# Patient Record
Sex: MALE | Race: WHITE | NOT HISPANIC OR LATINO | Employment: UNEMPLOYED | URBAN - NONMETROPOLITAN AREA
[De-identification: names, ages, dates, MRNs, and addresses within clinical notes are randomized per-mention and may not be internally consistent; named-entity substitution may affect disease eponyms.]

---

## 2018-06-24 ENCOUNTER — HOSPITAL ENCOUNTER (EMERGENCY)
Facility: HOSPITAL | Age: 43
Discharge: ANOTHER HOSPITAL | End: 2018-06-24
Attending: INTERNAL MEDICINE | Admitting: INTERNAL MEDICINE

## 2018-06-24 ENCOUNTER — APPOINTMENT (OUTPATIENT)
Dept: GENERAL RADIOLOGY | Facility: HOSPITAL | Age: 43
End: 2018-06-24

## 2018-06-24 VITALS
WEIGHT: 240 LBS | DIASTOLIC BLOOD PRESSURE: 72 MMHG | BODY MASS INDEX: 32.51 KG/M2 | HEART RATE: 94 BPM | SYSTOLIC BLOOD PRESSURE: 127 MMHG | TEMPERATURE: 98.2 F | RESPIRATION RATE: 20 BRPM | HEIGHT: 72 IN | OXYGEN SATURATION: 99 %

## 2018-06-24 DIAGNOSIS — B19.9 VIRAL HEPATITIS WITHOUT HEPATIC COMA, UNSPECIFIED CHRONICITY, UNSPECIFIED VIRAL HEPATITIS TYPE: Primary | ICD-10-CM

## 2018-06-24 LAB
ALBUMIN SERPL-MCNC: 3.1 G/DL (ref 3.5–5)
ALBUMIN/GLOB SERPL: 0.9 G/DL (ref 1.5–2.5)
ALP SERPL-CCNC: 180 U/L (ref 40–129)
ALT SERPL W P-5'-P-CCNC: 3320 U/L (ref 10–44)
AMYLASE SERPL-CCNC: 97 U/L (ref 28–100)
ANION GAP SERPL CALCULATED.3IONS-SCNC: 6.4 MMOL/L (ref 3.6–11.2)
AST SERPL-CCNC: 1481 U/L (ref 10–34)
BASOPHILS # BLD AUTO: 0.04 10*3/MM3 (ref 0–0.3)
BASOPHILS NFR BLD AUTO: 0.9 % (ref 0–2)
BILIRUB SERPL-MCNC: 11.3 MG/DL (ref 0.2–1.8)
BNP SERPL-MCNC: 5 PG/ML (ref 0–100)
BUN BLD-MCNC: 14 MG/DL (ref 7–21)
BUN/CREAT SERPL: 14.4 (ref 7–25)
CALCIUM SPEC-SCNC: 8.5 MG/DL (ref 7.7–10)
CHLORIDE SERPL-SCNC: 101 MMOL/L (ref 99–112)
CO2 SERPL-SCNC: 25.6 MMOL/L (ref 24.3–31.9)
CREAT BLD-MCNC: 0.97 MG/DL (ref 0.43–1.29)
DEPRECATED RDW RBC AUTO: 47.6 FL (ref 37–54)
EOSINOPHIL # BLD AUTO: 0.1 10*3/MM3 (ref 0–0.7)
EOSINOPHIL NFR BLD AUTO: 2.2 % (ref 0–5)
ERYTHROCYTE [DISTWIDTH] IN BLOOD BY AUTOMATED COUNT: 15.1 % (ref 11.5–14.5)
GFR SERPL CREATININE-BSD FRML MDRD: 85 ML/MIN/1.73
GLOBULIN UR ELPH-MCNC: 3.5 GM/DL
GLUCOSE BLD-MCNC: 97 MG/DL (ref 70–110)
HAV IGM SERPL QL IA: REACTIVE
HBV CORE IGM SERPL QL IA: ABNORMAL
HBV SURFACE AG SERPL QL CFM: NORMAL
HBV SURFACE AG SERPL QL IA: REACTIVE
HCT VFR BLD AUTO: 43.9 % (ref 42–52)
HCV AB SER DONR QL: REACTIVE
HGB BLD-MCNC: 15.6 G/DL (ref 14–18)
IMM GRANULOCYTES # BLD: 0 10*3/MM3 (ref 0–0.03)
IMM GRANULOCYTES NFR BLD: 0 % (ref 0–0.5)
INR PPP: 1.28 (ref 0.9–1.1)
LIPASE SERPL-CCNC: 67 U/L (ref 13–60)
LYMPHOCYTES # BLD AUTO: 1.35 10*3/MM3 (ref 1–3)
LYMPHOCYTES NFR BLD AUTO: 29.3 % (ref 21–51)
MCH RBC QN AUTO: 30.4 PG (ref 27–33)
MCHC RBC AUTO-ENTMCNC: 35.5 G/DL (ref 33–37)
MCV RBC AUTO: 85.6 FL (ref 80–94)
MONOCYTES # BLD AUTO: 0.85 10*3/MM3 (ref 0.1–0.9)
MONOCYTES NFR BLD AUTO: 18.4 % (ref 0–10)
NEUTROPHILS # BLD AUTO: 2.27 10*3/MM3 (ref 1.4–6.5)
NEUTROPHILS NFR BLD AUTO: 49.2 % (ref 30–70)
OSMOLALITY SERPL CALC.SUM OF ELEC: 266.8 MOSM/KG (ref 273–305)
PLATELET # BLD AUTO: 129 10*3/MM3 (ref 130–400)
PMV BLD AUTO: 12.2 FL (ref 6–10)
POTASSIUM BLD-SCNC: 3.7 MMOL/L (ref 3.5–5.3)
PROT SERPL-MCNC: 6.6 G/DL (ref 6–8)
PROTHROMBIN TIME: 16.2 SECONDS (ref 11–15.4)
RBC # BLD AUTO: 5.13 10*6/MM3 (ref 4.7–6.1)
SODIUM BLD-SCNC: 133 MMOL/L (ref 135–153)
TROPONIN I SERPL-MCNC: 0.01 NG/ML
TROPONIN I SERPL-MCNC: <0.006 NG/ML
WBC NRBC COR # BLD: 4.61 10*3/MM3 (ref 4.5–12.5)

## 2018-06-24 PROCEDURE — 85025 COMPLETE CBC W/AUTO DIFF WBC: CPT | Performed by: INTERNAL MEDICINE

## 2018-06-24 PROCEDURE — 71045 X-RAY EXAM CHEST 1 VIEW: CPT

## 2018-06-24 PROCEDURE — 71045 X-RAY EXAM CHEST 1 VIEW: CPT | Performed by: RADIOLOGY

## 2018-06-24 PROCEDURE — 93010 ELECTROCARDIOGRAM REPORT: CPT | Performed by: INTERNAL MEDICINE

## 2018-06-24 PROCEDURE — 83690 ASSAY OF LIPASE: CPT | Performed by: INTERNAL MEDICINE

## 2018-06-24 PROCEDURE — 82150 ASSAY OF AMYLASE: CPT | Performed by: INTERNAL MEDICINE

## 2018-06-24 PROCEDURE — 87341 HEP B SURFACE AG NEUTRLZJ IA: CPT | Performed by: INTERNAL MEDICINE

## 2018-06-24 PROCEDURE — 99284 EMERGENCY DEPT VISIT MOD MDM: CPT

## 2018-06-24 PROCEDURE — 87040 BLOOD CULTURE FOR BACTERIA: CPT | Performed by: INTERNAL MEDICINE

## 2018-06-24 PROCEDURE — 80053 COMPREHEN METABOLIC PANEL: CPT | Performed by: INTERNAL MEDICINE

## 2018-06-24 PROCEDURE — 84484 ASSAY OF TROPONIN QUANT: CPT | Performed by: INTERNAL MEDICINE

## 2018-06-24 PROCEDURE — 93005 ELECTROCARDIOGRAM TRACING: CPT | Performed by: INTERNAL MEDICINE

## 2018-06-24 PROCEDURE — 85610 PROTHROMBIN TIME: CPT | Performed by: INTERNAL MEDICINE

## 2018-06-24 PROCEDURE — 80074 ACUTE HEPATITIS PANEL: CPT | Performed by: INTERNAL MEDICINE

## 2018-06-24 PROCEDURE — 83880 ASSAY OF NATRIURETIC PEPTIDE: CPT | Performed by: INTERNAL MEDICINE

## 2018-06-24 RX ORDER — SODIUM CHLORIDE 0.9 % (FLUSH) 0.9 %
10 SYRINGE (ML) INJECTION AS NEEDED
Status: DISCONTINUED | OUTPATIENT
Start: 2018-06-24 | End: 2018-06-24 | Stop reason: HOSPADM

## 2018-06-29 LAB
BACTERIA SPEC AEROBE CULT: NORMAL
BACTERIA SPEC AEROBE CULT: NORMAL

## 2021-04-09 ENCOUNTER — HOSPITAL ENCOUNTER (OUTPATIENT)
Dept: VACCINE CLINIC | Facility: HOSPITAL | Age: 46
Discharge: HOME | End: 2021-04-09
Attending: INTERNAL MEDICINE

## 2022-08-24 ENCOUNTER — APPOINTMENT (OUTPATIENT)
Dept: GENERAL RADIOLOGY | Facility: HOSPITAL | Age: 47
End: 2022-08-24

## 2022-08-24 ENCOUNTER — APPOINTMENT (OUTPATIENT)
Dept: CT IMAGING | Facility: HOSPITAL | Age: 47
End: 2022-08-24

## 2022-08-24 ENCOUNTER — HOSPITAL ENCOUNTER (OUTPATIENT)
Facility: HOSPITAL | Age: 47
Discharge: HOME | End: 2022-08-25
Attending: STUDENT IN AN ORGANIZED HEALTH CARE EDUCATION/TRAINING PROGRAM | Admitting: UROLOGY

## 2022-08-24 ENCOUNTER — HOSPITAL ENCOUNTER (EMERGENCY)
Facility: HOSPITAL | Age: 47
Discharge: ANOTHER HOSPITAL | End: 2022-08-24
Attending: STUDENT IN AN ORGANIZED HEALTH CARE EDUCATION/TRAINING PROGRAM | Admitting: STUDENT IN AN ORGANIZED HEALTH CARE EDUCATION/TRAINING PROGRAM

## 2022-08-24 VITALS
TEMPERATURE: 98.8 F | RESPIRATION RATE: 18 BRPM | OXYGEN SATURATION: 94 % | SYSTOLIC BLOOD PRESSURE: 152 MMHG | DIASTOLIC BLOOD PRESSURE: 95 MMHG | BODY MASS INDEX: 37.8 KG/M2 | HEART RATE: 95 BPM | HEIGHT: 71 IN | WEIGHT: 270 LBS

## 2022-08-24 DIAGNOSIS — N17.9 ACUTE RENAL FAILURE, UNSPECIFIED ACUTE RENAL FAILURE TYPE: ICD-10-CM

## 2022-08-24 DIAGNOSIS — N20.1 URETEROLITHIASIS: Primary | ICD-10-CM

## 2022-08-24 DIAGNOSIS — N20.0 NEPHROLITHIASIS: Primary | ICD-10-CM

## 2022-08-24 LAB
ALBUMIN SERPL-MCNC: 4.02 G/DL (ref 3.5–5.2)
ALBUMIN/GLOB SERPL: 1.4 G/DL
ALP SERPL-CCNC: 92 U/L (ref 39–117)
ALT SERPL W P-5'-P-CCNC: 53 U/L (ref 1–41)
ANION GAP SERPL CALCULATED.3IONS-SCNC: 11 MMOL/L (ref 5–15)
APTT PPP: 27 SECONDS (ref 26.5–34.5)
AST SERPL-CCNC: 43 U/L (ref 1–40)
BACTERIA UR QL AUTO: ABNORMAL /HPF
BASOPHILS # BLD AUTO: 0.04 10*3/MM3 (ref 0–0.2)
BASOPHILS NFR BLD AUTO: 0.4 % (ref 0–1.5)
BILIRUB SERPL-MCNC: 1.1 MG/DL (ref 0–1.2)
BILIRUB UR QL STRIP: NEGATIVE
BUN SERPL-MCNC: 24 MG/DL (ref 6–20)
BUN/CREAT SERPL: 13.3 (ref 7–25)
CALCIUM SPEC-SCNC: 9.2 MG/DL (ref 8.6–10.5)
CHLORIDE SERPL-SCNC: 101 MMOL/L (ref 98–107)
CK SERPL-CCNC: 176 U/L (ref 20–200)
CLARITY UR: CLEAR
CO2 SERPL-SCNC: 26 MMOL/L (ref 22–29)
COLOR UR: YELLOW
CREAT SERPL-MCNC: 1.8 MG/DL (ref 0.76–1.27)
CRP SERPL-MCNC: 4.58 MG/DL (ref 0–0.5)
DEPRECATED RDW RBC AUTO: 44.8 FL (ref 37–54)
EGFRCR SERPLBLD CKD-EPI 2021: 46.7 ML/MIN/1.73
EOSINOPHIL # BLD AUTO: 0.17 10*3/MM3 (ref 0–0.4)
EOSINOPHIL NFR BLD AUTO: 1.5 % (ref 0.3–6.2)
ERYTHROCYTE [DISTWIDTH] IN BLOOD BY AUTOMATED COUNT: 13.3 % (ref 12.3–15.4)
FLUAV RNA RESP QL NAA+PROBE: NOT DETECTED
FLUBV RNA RESP QL NAA+PROBE: NOT DETECTED
GLOBULIN UR ELPH-MCNC: 2.8 GM/DL
GLUCOSE SERPL-MCNC: 154 MG/DL (ref 65–99)
GLUCOSE UR STRIP-MCNC: NEGATIVE MG/DL
HCT VFR BLD AUTO: 45.6 % (ref 37.5–51)
HGB BLD-MCNC: 15.4 G/DL (ref 13–17.7)
HGB UR QL STRIP.AUTO: ABNORMAL
HOLD SPECIMEN: NORMAL
HOLD SPECIMEN: NORMAL
HYALINE CASTS UR QL AUTO: ABNORMAL /LPF
IMM GRANULOCYTES # BLD AUTO: 0.03 10*3/MM3 (ref 0–0.05)
IMM GRANULOCYTES NFR BLD AUTO: 0.3 % (ref 0–0.5)
INR PPP: 1.05 (ref 0.9–1.1)
KETONES UR QL STRIP: NEGATIVE
LEUKOCYTE ESTERASE UR QL STRIP.AUTO: ABNORMAL
LYMPHOCYTES # BLD AUTO: 1.05 10*3/MM3 (ref 0.7–3.1)
LYMPHOCYTES NFR BLD AUTO: 9.3 % (ref 19.6–45.3)
MAGNESIUM SERPL-MCNC: 1.9 MG/DL (ref 1.6–2.6)
MCH RBC QN AUTO: 30.6 PG (ref 26.6–33)
MCHC RBC AUTO-ENTMCNC: 33.8 G/DL (ref 31.5–35.7)
MCV RBC AUTO: 90.7 FL (ref 79–97)
MONOCYTES # BLD AUTO: 1.2 10*3/MM3 (ref 0.1–0.9)
MONOCYTES NFR BLD AUTO: 10.7 % (ref 5–12)
NEUTROPHILS NFR BLD AUTO: 77.8 % (ref 42.7–76)
NEUTROPHILS NFR BLD AUTO: 8.74 10*3/MM3 (ref 1.7–7)
NITRITE UR QL STRIP: NEGATIVE
NRBC BLD AUTO-RTO: 0 /100 WBC (ref 0–0.2)
PH UR STRIP.AUTO: 6.5 [PH] (ref 5–8)
PLATELET # BLD AUTO: 160 10*3/MM3 (ref 140–450)
PMV BLD AUTO: 11.8 FL (ref 6–12)
POTASSIUM SERPL-SCNC: 4 MMOL/L (ref 3.5–5.2)
PROT SERPL-MCNC: 6.8 G/DL (ref 6–8.5)
PROT UR QL STRIP: NEGATIVE
PROTHROMBIN TIME: 13.9 SECONDS (ref 12.1–14.7)
RBC # BLD AUTO: 5.03 10*6/MM3 (ref 4.14–5.8)
RBC # UR STRIP: ABNORMAL /HPF
REF LAB TEST METHOD: ABNORMAL
SARS-COV-2 RNA PNL SPEC NAA+PROBE: NOT DETECTED
SARS-COV-2 RNA RESP QL NAA+PROBE: NOT DETECTED
SODIUM SERPL-SCNC: 138 MMOL/L (ref 136–145)
SP GR UR STRIP: 1.02 (ref 1–1.03)
SQUAMOUS #/AREA URNS HPF: ABNORMAL /HPF
TSH SERPL DL<=0.05 MIU/L-ACNC: 1.92 UIU/ML (ref 0.27–4.2)
UROBILINOGEN UR QL STRIP: ABNORMAL
WBC # UR STRIP: ABNORMAL /HPF
WBC NRBC COR # BLD: 11.23 10*3/MM3 (ref 3.4–10.8)
WHOLE BLOOD HOLD COAG: NORMAL
WHOLE BLOOD HOLD SPECIMEN: NORMAL

## 2022-08-24 PROCEDURE — 25010000002 PROCHLORPERAZINE 10 MG/2ML SOLUTION: Performed by: STUDENT IN AN ORGANIZED HEALTH CARE EDUCATION/TRAINING PROGRAM

## 2022-08-24 PROCEDURE — 85025 COMPLETE CBC W/AUTO DIFF WBC: CPT | Performed by: STUDENT IN AN ORGANIZED HEALTH CARE EDUCATION/TRAINING PROGRAM

## 2022-08-24 PROCEDURE — 25010000002 ONDANSETRON PER 1 MG: Performed by: STUDENT IN AN ORGANIZED HEALTH CARE EDUCATION/TRAINING PROGRAM

## 2022-08-24 PROCEDURE — 96374 THER/PROPH/DIAG INJ IV PUSH: CPT

## 2022-08-24 PROCEDURE — C9803 HOPD COVID-19 SPEC COLLECT: HCPCS | Performed by: STUDENT IN AN ORGANIZED HEALTH CARE EDUCATION/TRAINING PROGRAM

## 2022-08-24 PROCEDURE — 80053 COMPREHEN METABOLIC PANEL: CPT | Performed by: STUDENT IN AN ORGANIZED HEALTH CARE EDUCATION/TRAINING PROGRAM

## 2022-08-24 PROCEDURE — 96375 TX/PRO/DX INJ NEW DRUG ADDON: CPT

## 2022-08-24 PROCEDURE — 82550 ASSAY OF CK (CPK): CPT | Performed by: STUDENT IN AN ORGANIZED HEALTH CARE EDUCATION/TRAINING PROGRAM

## 2022-08-24 PROCEDURE — 93010 ELECTROCARDIOGRAM REPORT: CPT | Performed by: INTERNAL MEDICINE

## 2022-08-24 PROCEDURE — 83735 ASSAY OF MAGNESIUM: CPT | Performed by: STUDENT IN AN ORGANIZED HEALTH CARE EDUCATION/TRAINING PROGRAM

## 2022-08-24 PROCEDURE — 96376 TX/PRO/DX INJ SAME DRUG ADON: CPT

## 2022-08-24 PROCEDURE — 71045 X-RAY EXAM CHEST 1 VIEW: CPT

## 2022-08-24 PROCEDURE — 74176 CT ABD & PELVIS W/O CONTRAST: CPT

## 2022-08-24 PROCEDURE — 25010000002 MORPHINE PER 10 MG: Performed by: UROLOGY

## 2022-08-24 PROCEDURE — G0378 HOSPITAL OBSERVATION PER HR: HCPCS

## 2022-08-24 PROCEDURE — 25010000002 CEFTRIAXONE PER 250 MG: Performed by: STUDENT IN AN ORGANIZED HEALTH CARE EDUCATION/TRAINING PROGRAM

## 2022-08-24 PROCEDURE — 25010000002 DIPHENHYDRAMINE PER 50 MG: Performed by: STUDENT IN AN ORGANIZED HEALTH CARE EDUCATION/TRAINING PROGRAM

## 2022-08-24 PROCEDURE — 99219 PR INITIAL OBSERVATION CARE/DAY 50 MINUTES: CPT | Performed by: NURSE PRACTITIONER

## 2022-08-24 PROCEDURE — 85730 THROMBOPLASTIN TIME PARTIAL: CPT | Performed by: STUDENT IN AN ORGANIZED HEALTH CARE EDUCATION/TRAINING PROGRAM

## 2022-08-24 PROCEDURE — 86140 C-REACTIVE PROTEIN: CPT | Performed by: STUDENT IN AN ORGANIZED HEALTH CARE EDUCATION/TRAINING PROGRAM

## 2022-08-24 PROCEDURE — 52352 CYSTOURETERO W/STONE REMOVE: CPT | Performed by: UROLOGY

## 2022-08-24 PROCEDURE — 87635 SARS-COV-2 COVID-19 AMP PRB: CPT | Performed by: STUDENT IN AN ORGANIZED HEALTH CARE EDUCATION/TRAINING PROGRAM

## 2022-08-24 PROCEDURE — 99221 1ST HOSP IP/OBS SF/LOW 40: CPT | Performed by: NURSE PRACTITIONER

## 2022-08-24 PROCEDURE — 85610 PROTHROMBIN TIME: CPT | Performed by: STUDENT IN AN ORGANIZED HEALTH CARE EDUCATION/TRAINING PROGRAM

## 2022-08-24 PROCEDURE — 71250 CT THORAX DX C-: CPT

## 2022-08-24 PROCEDURE — 81001 URINALYSIS AUTO W/SCOPE: CPT | Performed by: STUDENT IN AN ORGANIZED HEALTH CARE EDUCATION/TRAINING PROGRAM

## 2022-08-24 PROCEDURE — 87086 URINE CULTURE/COLONY COUNT: CPT | Performed by: STUDENT IN AN ORGANIZED HEALTH CARE EDUCATION/TRAINING PROGRAM

## 2022-08-24 PROCEDURE — 99284 EMERGENCY DEPT VISIT MOD MDM: CPT

## 2022-08-24 PROCEDURE — 87636 SARSCOV2 & INF A&B AMP PRB: CPT | Performed by: STUDENT IN AN ORGANIZED HEALTH CARE EDUCATION/TRAINING PROGRAM

## 2022-08-24 PROCEDURE — 25010000002 MORPHINE PER 10 MG: Performed by: STUDENT IN AN ORGANIZED HEALTH CARE EDUCATION/TRAINING PROGRAM

## 2022-08-24 PROCEDURE — 84443 ASSAY THYROID STIM HORMONE: CPT | Performed by: STUDENT IN AN ORGANIZED HEALTH CARE EDUCATION/TRAINING PROGRAM

## 2022-08-24 PROCEDURE — 93005 ELECTROCARDIOGRAM TRACING: CPT | Performed by: STUDENT IN AN ORGANIZED HEALTH CARE EDUCATION/TRAINING PROGRAM

## 2022-08-24 PROCEDURE — 96365 THER/PROPH/DIAG IV INF INIT: CPT

## 2022-08-24 RX ORDER — BISACODYL 10 MG
10 SUPPOSITORY, RECTAL RECTAL DAILY PRN
Status: DISCONTINUED | OUTPATIENT
Start: 2022-08-24 | End: 2022-08-25 | Stop reason: HOSPADM

## 2022-08-24 RX ORDER — ACETAMINOPHEN 325 MG/1
650 TABLET ORAL EVERY 4 HOURS PRN
Status: DISCONTINUED | OUTPATIENT
Start: 2022-08-24 | End: 2022-08-25 | Stop reason: HOSPADM

## 2022-08-24 RX ORDER — MORPHINE SULFATE 2 MG/ML
2 INJECTION, SOLUTION INTRAMUSCULAR; INTRAVENOUS EVERY 4 HOURS PRN
Status: DISCONTINUED | OUTPATIENT
Start: 2022-08-24 | End: 2022-08-25 | Stop reason: HOSPADM

## 2022-08-24 RX ORDER — ONDANSETRON 4 MG/1
4 TABLET, FILM COATED ORAL EVERY 6 HOURS PRN
Status: DISCONTINUED | OUTPATIENT
Start: 2022-08-24 | End: 2022-08-25 | Stop reason: HOSPADM

## 2022-08-24 RX ORDER — PROCHLORPERAZINE EDISYLATE 5 MG/ML
5 INJECTION INTRAMUSCULAR; INTRAVENOUS ONCE
Status: COMPLETED | OUTPATIENT
Start: 2022-08-24 | End: 2022-08-24

## 2022-08-24 RX ORDER — ALBUTEROL SULFATE 2.5 MG/3ML
2.5 SOLUTION RESPIRATORY (INHALATION) EVERY 6 HOURS PRN
Status: DISCONTINUED | OUTPATIENT
Start: 2022-08-24 | End: 2022-08-25 | Stop reason: HOSPADM

## 2022-08-24 RX ORDER — NICOTINE 21 MG/24HR
1 PATCH, TRANSDERMAL 24 HOURS TRANSDERMAL EVERY 24 HOURS
Status: DISCONTINUED | OUTPATIENT
Start: 2022-08-24 | End: 2022-08-25 | Stop reason: HOSPADM

## 2022-08-24 RX ORDER — ACETAMINOPHEN 500 MG
1000 TABLET ORAL ONCE
Status: COMPLETED | OUTPATIENT
Start: 2022-08-24 | End: 2022-08-24

## 2022-08-24 RX ORDER — ONDANSETRON 2 MG/ML
4 INJECTION INTRAMUSCULAR; INTRAVENOUS EVERY 6 HOURS PRN
Status: DISCONTINUED | OUTPATIENT
Start: 2022-08-24 | End: 2022-08-25 | Stop reason: HOSPADM

## 2022-08-24 RX ORDER — NALOXONE HCL 0.4 MG/ML
0.4 VIAL (ML) INJECTION
Status: DISCONTINUED | OUTPATIENT
Start: 2022-08-24 | End: 2022-08-25 | Stop reason: HOSPADM

## 2022-08-24 RX ORDER — IBUPROFEN 800 MG/1
800 TABLET ORAL EVERY 6 HOURS PRN
COMMUNITY
End: 2022-08-25 | Stop reason: HOSPADM

## 2022-08-24 RX ORDER — AMOXICILLIN 250 MG
2 CAPSULE ORAL 2 TIMES DAILY
Status: DISCONTINUED | OUTPATIENT
Start: 2022-08-24 | End: 2022-08-25 | Stop reason: HOSPADM

## 2022-08-24 RX ORDER — POLYETHYLENE GLYCOL 3350 17 G/17G
17 POWDER, FOR SOLUTION ORAL DAILY PRN
Status: DISCONTINUED | OUTPATIENT
Start: 2022-08-24 | End: 2022-08-25 | Stop reason: HOSPADM

## 2022-08-24 RX ORDER — ONDANSETRON 2 MG/ML
4 INJECTION INTRAMUSCULAR; INTRAVENOUS ONCE
Status: COMPLETED | OUTPATIENT
Start: 2022-08-24 | End: 2022-08-24

## 2022-08-24 RX ORDER — BISACODYL 5 MG/1
5 TABLET, DELAYED RELEASE ORAL DAILY PRN
Status: DISCONTINUED | OUTPATIENT
Start: 2022-08-24 | End: 2022-08-25 | Stop reason: HOSPADM

## 2022-08-24 RX ORDER — MORPHINE SULFATE 2 MG/ML
2 INJECTION, SOLUTION INTRAMUSCULAR; INTRAVENOUS ONCE
Status: COMPLETED | OUTPATIENT
Start: 2022-08-24 | End: 2022-08-24

## 2022-08-24 RX ORDER — SODIUM CHLORIDE 9 MG/ML
75 INJECTION, SOLUTION INTRAVENOUS CONTINUOUS
Status: DISCONTINUED | OUTPATIENT
Start: 2022-08-24 | End: 2022-08-25 | Stop reason: HOSPADM

## 2022-08-24 RX ORDER — CHOLECALCIFEROL (VITAMIN D3) 125 MCG
5 CAPSULE ORAL NIGHTLY PRN
Status: DISCONTINUED | OUTPATIENT
Start: 2022-08-24 | End: 2022-08-25 | Stop reason: HOSPADM

## 2022-08-24 RX ORDER — DIPHENHYDRAMINE HYDROCHLORIDE 50 MG/ML
50 INJECTION INTRAMUSCULAR; INTRAVENOUS ONCE
Status: COMPLETED | OUTPATIENT
Start: 2022-08-24 | End: 2022-08-24

## 2022-08-24 RX ORDER — SODIUM CHLORIDE 0.9 % (FLUSH) 0.9 %
10 SYRINGE (ML) INJECTION EVERY 12 HOURS SCHEDULED
Status: DISCONTINUED | OUTPATIENT
Start: 2022-08-24 | End: 2022-08-25 | Stop reason: HOSPADM

## 2022-08-24 RX ORDER — OXYCODONE HYDROCHLORIDE AND ACETAMINOPHEN 5; 325 MG/1; MG/1
1 TABLET ORAL EVERY 4 HOURS PRN
Status: DISCONTINUED | OUTPATIENT
Start: 2022-08-24 | End: 2022-08-25 | Stop reason: HOSPADM

## 2022-08-24 RX ADMIN — CEFTRIAXONE 1 G: 1 INJECTION, POWDER, FOR SOLUTION INTRAMUSCULAR; INTRAVENOUS at 08:01

## 2022-08-24 RX ADMIN — Medication 5 MG: at 20:14

## 2022-08-24 RX ADMIN — Medication 10 ML: at 20:15

## 2022-08-24 RX ADMIN — MORPHINE SULFATE 2 MG: 2 INJECTION, SOLUTION INTRAMUSCULAR; INTRAVENOUS at 10:03

## 2022-08-24 RX ADMIN — SODIUM CHLORIDE 75 ML/HR: 9 INJECTION, SOLUTION INTRAVENOUS at 14:15

## 2022-08-24 RX ADMIN — PROCHLORPERAZINE EDISYLATE 5 MG: 5 INJECTION INTRAMUSCULAR; INTRAVENOUS at 05:54

## 2022-08-24 RX ADMIN — SODIUM CHLORIDE 1000 ML: 9 INJECTION, SOLUTION INTRAVENOUS at 05:55

## 2022-08-24 RX ADMIN — MORPHINE SULFATE 2 MG: 2 INJECTION, SOLUTION INTRAMUSCULAR; INTRAVENOUS at 20:14

## 2022-08-24 RX ADMIN — DIPHENHYDRAMINE HYDROCHLORIDE 50 MG: 50 INJECTION INTRAMUSCULAR; INTRAVENOUS at 05:53

## 2022-08-24 RX ADMIN — ACETAMINOPHEN 1000 MG: 500 TABLET ORAL at 07:35

## 2022-08-24 RX ADMIN — ACETAMINOPHEN 650 MG: 325 TABLET ORAL at 20:20

## 2022-08-24 RX ADMIN — ONDANSETRON 4 MG: 2 INJECTION INTRAMUSCULAR; INTRAVENOUS at 05:52

## 2022-08-24 RX ADMIN — MORPHINE SULFATE 4 MG: 4 INJECTION, SOLUTION INTRAMUSCULAR; INTRAVENOUS at 07:48

## 2022-08-24 RX ADMIN — NICOTINE 1 PATCH: 21 PATCH TRANSDERMAL at 15:26

## 2022-08-24 RX ADMIN — OXYCODONE HYDROCHLORIDE AND ACETAMINOPHEN 1 TABLET: 5; 325 TABLET ORAL at 18:56

## 2022-08-24 RX ADMIN — MORPHINE SULFATE 2 MG: 2 INJECTION, SOLUTION INTRAMUSCULAR; INTRAVENOUS at 15:26

## 2022-08-24 NOTE — H&P
[facility]      Name:  Lawrence Olea   Age:  47 y.o.  Sex:  male  :  1975  MRN:  [id number]   Visit Number:  [id number]  Admission Date:  2022  Date Of Service:  22  Primary Care Physician:  Provider, No Known    Chief Complaint:     Abdominal Pain    History Of Presenting Illness:      Patient is a transfer from [facility] today.  Patient is a 47 year old male who denies any past health medical problems or any daily medications.  Chart review notes that patient does have past history of illicit drug use and acute Hepatitis.  Patient states he began have right flank and abdominal pain about 3 days ago.  Patient states he tried Ibuprofen without relief.  States that he does have some pain after finishing urinating.  States pain is worse with movement.  Patient states he currently smokes 1 PPD for past 15 years, denies alcohol or drug use.  Works in home construction.  He is accompanied by his significant other at bedside.  He denies having a family doctor or routinely visiting the doctor.  Patient sates his pain is currently manageable at 6/7.  States that his legs always have some swelling.  Patient also states he does not drink any water and consumes at least 6-8 sodas daily with decreased urine output being his normal.      Work up at [facility] noted glucose-154, creatinine-1.8, BUN-24, and mildly elevated AST-43 and ALT-53, WBC-11.  Patient did apparently complain of shortness of breath at [facility] prior to arriving to this facility.  He does not have these complaints on my exam, he only complains of flank/ abdominal pain.  CT of chest without contrast noted 6 mm calculus in the distal right ureter causing a moderate right sided obstructive uropathy and small hiatal hernia.  CT of Abdomen/Pelvis with same findings.  CXR noted no acute cardiopulmonary findings.  Urinalysis noted no bacteria, 6-12 WBC, 6-12 RBC, and  "small amount of blood.  Patient received NS bolus, compazine, benadryl, zofran, tylenol, morphine, and rocephin prior to transfer.  [facility] did not have Urology coverage, so patient was accepted to this facility with Dr. Nevarez consulting.      Review Of Systems:    All systems were reviewed and negative except as mentioned in history of presenting illness, assessment and plan.    Past Medical History: Patient  has a past medical history of Asthma.    Past Surgical History: Patient  has a past surgical history that includes Appendectomy and Eye surgery.    Social History: Patient  reports that he has been smoking. He has been smoking about 0.50 packs per day. He does not have any smokeless tobacco history on file. He reports current drug use. Drug: Methamphetamines. He reports that he does not drink alcohol.    Family History:  Patient's family history has been reviewed and found to be non-contributory.     Allergies:      Penicillins    Home Medications:    Prior to Admission Medications     Prescriptions Last Dose Informant Patient Reported? Taking?    ibuprofen (ADVIL,MOTRIN) 800 MG tablet Past Week  Yes Yes    Take 800 mg by mouth Every 6 (Six) Hours As Needed for Mild Pain .        ED Medications:    Medications - No data to display  Vital Signs:  Temp:  [98.1 °F (36.7 °C)-98.9 °F (37.2 °C)] 98.9 °F (37.2 °C)  Heart Rate:  [85-99] 85  Resp:  [16-20] 18  BP: (152-157)/() 156/91        08/24/22  1132   Weight: 123 kg (272 lb 0.8 oz)     Body mass index is 37.94 kg/m².    Physical Exam:     Most recent vital Signs: /91 (BP Location: Right arm, Patient Position: Lying)   Pulse 85   Temp 98.9 °F (37.2 °C) (Oral)   Resp 18   Ht 180.3 cm (71\")   Wt 123 kg (272 lb 0.8 oz)   SpO2 96%   BMI 37.94 kg/m²     Physical Exam  Constitutional:       General: He is not in acute distress.     Appearance: Normal appearance. He is obese. He is not toxic-appearing.   HENT:      Head: Normocephalic and " atraumatic.   Eyes:      Pupils: Pupils are equal, round, and reactive to light.   Cardiovascular:      Rate and Rhythm: Normal rate and regular rhythm.      Pulses: Normal pulses.   Pulmonary:      Effort: Pulmonary effort is normal.      Breath sounds: Wheezing (Left anterior and diffuse posterior) present.   Abdominal:      General: Bowel sounds are normal.      Palpations: Abdomen is soft.      Tenderness: There is abdominal tenderness (Right CVA tenderness and Right Abdominal tenderness on exam). There is right CVA tenderness.   Musculoskeletal:         General: Normal range of motion.      Right lower leg: Edema present.      Left lower leg: Edema present.      Comments: Non pitting   Skin:     General: Skin is warm and dry.      Capillary Refill: Capillary refill takes less than 2 seconds.      Comments: Multiple scabs noted to legs   Neurological:      General: No focal deficit present.      Mental Status: He is alert and oriented to person, place, and time. Mental status is at baseline.   Psychiatric:         Mood and Affect: Mood normal.         Behavior: Behavior normal.         Thought Content: Thought content normal.         Laboratory data:    I have reviewed the labs done in the emergency room.    Results from last 7 days   Lab Units 08/24/22  0545   SODIUM mmol/L 138   POTASSIUM mmol/L 4.0   CHLORIDE mmol/L 101   CO2 mmol/L 26.0   BUN mg/dL 24*   CREATININE mg/dL 1.80*   CALCIUM mg/dL 9.2   BILIRUBIN mg/dL 1.1   ALK PHOS U/L 92   ALT (SGPT) U/L 53*   AST (SGOT) U/L 43*   GLUCOSE mg/dL 154*     Results from last 7 days   Lab Units 08/24/22  0545   WBC 10*3/mm3 11.23*   HEMOGLOBIN g/dL 15.4   HEMATOCRIT % 45.6   PLATELETS 10*3/mm3 160     Results from last 7 days   Lab Units 08/24/22  0545   INR  1.05     Results from last 7 days   Lab Units 08/24/22  0545   CK TOTAL U/L 176                     Results from last 7 days   Lab Units 08/24/22  0800   COLOR UA  Yellow   GLUCOSE UA  Negative   KETONES UA   Negative   LEUKOCYTES UA  Small (1+)*   PH, URINE  6.5   BILIRUBIN UA  Negative   UROBILINOGEN UA  1.0 E.U./dL   RBC UA /HPF 6-12*   WBC UA /HPF 6-12*       Pain Management Panel    There is no flowsheet data to display.         EKG:      Sinus Rhythm with rate 92bpm with no acute ST/T wave abnomalities    Radiology:    CT Abdomen Pelvis Without Contrast    Result Date: 8/24/2022  CT Chest WO, CT Abdomen Pelvis WO INDICATION: Acute onset of right flank pain and right lower quadrant pain with cough, shortness of breath and malaise. TECHNIQUE: CT of the chest, abdomen and pelvis without contrast. Coronal and sagittal reconstructions were obtained.  Radiation dose reduction techniques included automated exposure control or exposure modulation based on body size. Radiation audit for number of CT and nuclear cardiology exams performed in the last year: 0.  COMPARISON: No pertinent prior study FINDINGS: Chest: Lungs are free of acute infiltrates. No pleural fluid. No pneumothorax. The heart size is normal. No endobronchial lesions. No threshold mediastinal or hilar adenopathy. Small hiatal hernia. Abdomen: There is evidence of a 6 mm right ureteral calculus in the distal right ureter just above the right ureterovesical junction. There is a moderate right sided obstructive uropathy. The left kidney appears unremarkable. The liver demonstrates no acute findings. Solitary gallstone identified within the gallbladder. The pancreas and spleen show no acute findings. No adrenal abnormalities. No retroperitoneal adenopathy. Pelvis: The bowel pattern is nonobstructive. Status post appendectomy. No free air. No free fluid. Urinary bladder appears unremarkable. Regional osseous structures show no acute or aggressive bone lesions.     6 mm calculus in the distal right ureter causing a moderate right sided obstructive uropathy. Small hiatal hernia. Signer Name: Wenceslao Kuo MD  Signed: 8/24/2022 7:03 AM  Workstation Name:  [facility]  [facility]    CT Chest Without Contrast Diagnostic    Result Date: 8/24/2022  CT Chest WO, CT Abdomen Pelvis WO INDICATION: Acute onset of right flank pain and right lower quadrant pain with cough, shortness of breath and malaise. TECHNIQUE: CT of the chest, abdomen and pelvis without contrast. Coronal and sagittal reconstructions were obtained.  Radiation dose reduction techniques included automated exposure control or exposure modulation based on body size. Radiation audit for number of CT and nuclear cardiology exams performed in the last year: 0.  COMPARISON: No pertinent prior study FINDINGS: Chest: Lungs are free of acute infiltrates. No pleural fluid. No pneumothorax. The heart size is normal. No endobronchial lesions. No threshold mediastinal or hilar adenopathy. Small hiatal hernia. Abdomen: There is evidence of a 6 mm right ureteral calculus in the distal right ureter just above the right ureterovesical junction. There is a moderate right sided obstructive uropathy. The left kidney appears unremarkable. The liver demonstrates no acute findings. Solitary gallstone identified within the gallbladder. The pancreas and spleen show no acute findings. No adrenal abnormalities. No retroperitoneal adenopathy. Pelvis: The bowel pattern is nonobstructive. Status post appendectomy. No free air. No free fluid. Urinary bladder appears unremarkable. Regional osseous structures show no acute or aggressive bone lesions.     6 mm calculus in the distal right ureter causing a moderate right sided obstructive uropathy. Small hiatal hernia. Signer Name: Wenceslao Kuo MD  Signed: 8/24/2022 7:03 AM  Workstation Name: [facility]  Radiology [facility]    XR Chest 1 View    Result Date: 8/24/2022  CR Chest 1 Vw INDICATION: Acute onset of cough and shortness of breath COMPARISON:  June 24, 2018 FINDINGS: Portable AP view(s) of the chest.  The heart and mediastinal contours are  normal. The lungs are clear. No pneumothorax or pleural effusion.     No acute cardiopulmonary findings. Signer Name: Wenceslao Kuo MD  Signed: 8/24/2022 6:17 AM  Workstation Name: [email or web address]  [facility]      Assessment:    1. Ureterolithiasis  2. Acute Kidney Injury  3. Tobacco abuse    Plan:    Ureterolithiasis/ NIEVES  -Pain and nausea control with Zofran, Morphine, and Percocet  -Gentle IV fluids with Normal Saline @75ml/hr  -Urology consulted   -NPO after midnight  -Recheck basic labs in am  -Urine culture is pending  -Will hold on further antibiotics as urine does not look grossly infected    Tobacco Abuse  -Nicotine patch  -Smoking cessation advised  -Duonebs as needed    Patient is a full code on admission discussed at bedside.    Risk Assessment: Moderate  DVT Prophylaxis: SCDs  Code Status: Full Code  Diet: Regular    Advance Care Planning   ACP discussion was held with the patient during this visit. Patient does not have an advance directive, declines further assistance.       FRANCESCA Deng  08/24/22  14:01 EDT    Dictated utilizing Dragon dictation.

## 2022-08-24 NOTE — PLAN OF CARE
Goal Outcome Evaluation:      Charlie is A+O x 4. Pain is managed per MAR. He ambulates independently to BR. Dr. Nevarez consulted. Charlie is NPO at this time to see if Dr. Nevarez plans intervention today. He is on room air and SpO2 remains above 90%.

## 2022-08-24 NOTE — CONSULTS
In Patient Consult      Patient Name: Lawrence Olea  : 1975   MRN: [id number]   Admission Date: 2022 11:08 AM     Admission Diagnosis: Nephrolithiasis    Care Team: Patient Care Team:  Provider, No Known as PCP - General    History of Present Illness: Lawrence Olea is a 47 y.o. male h/o Hep A & C who Urology was consulted to see for 6 mm right ureteral stone.  Patient reports right flank pain started about 3 days ago, no fever, chills, nausea or vomiting associated.  Fiancé reports patient does not like going to hospitals and she had to beg him to come after he was in severe pain    Subjective      Review of System:   I have reviewed the ROS documented in H&P and agree.    Past Medical History:   Past Medical History:   Diagnosis Date   • Asthma        Past Surgical History:   Past Surgical History:   Procedure Laterality Date   • APPENDECTOMY     • EYE SURGERY         Family History: No family history on file.    Social History:   Social History     Socioeconomic History   • Marital status:    Tobacco Use   • Smoking status: Current Every Day Smoker     Packs/day: 0.50   Substance and Sexual Activity   • Alcohol use: No   • Drug use: Yes     Types: Methamphetamines   • Sexual activity: Defer       Medications:     Current Facility-Administered Medications:   •  acetaminophen (TYLENOL) tablet 650 mg, 650 mg, Oral, Q4H PRN, Kerley, Brian Joseph, DO  •  albuterol (PROVENTIL) nebulizer solution 0.083% 2.5 mg/3mL, 2.5 mg, Nebulization, Q6H PRN, Vivian Flores APRN  •  sennosides-docusate (PERICOLACE) 8.6-50 MG per tablet 2 tablet, 2 tablet, Oral, BID **AND** polyethylene glycol (MIRALAX) packet 17 g, 17 g, Oral, Daily PRN **AND** bisacodyl (DULCOLAX) EC tablet 5 mg, 5 mg, Oral, Daily PRN **AND** bisacodyl (DULCOLAX) suppository 10 mg, 10 mg, Rectal, Daily PRN, Kerley, Brian Joseph, DO  •  melatonin tablet 5 mg, 5 mg, Oral, Nightly PRN, Kerley, Brian Joseph, DO  •  Morphine sulfate (PF)  "injection 2 mg, 2 mg, Intravenous, Q4H PRN **AND** naloxone (NARCAN) injection 0.4 mg, 0.4 mg, Intravenous, Q5 Min PRN, Kerley, Brian Joseph,   •  nicotine (NICODERM CQ) 21 MG/24HR patch 1 patch, 1 patch, Transdermal, Q24H, Kerley, Brian Joseph,   •  ondansetron (ZOFRAN) tablet 4 mg, 4 mg, Oral, Q6H PRN **OR** ondansetron (ZOFRAN) injection 4 mg, 4 mg, Intravenous, Q6H PRN, Kerley, Brian Joseph,   •  oxyCODONE-acetaminophen (PERCOCET) 5-325 MG per tablet 1 tablet, 1 tablet, Oral, Q4H PRN, Kerley, Brian Joseph,   •  sodium chloride 0.9 % flush 10 mL, 10 mL, Intravenous, Q12H, Kerley, Brian Joseph, DO  •  sodium chloride 0.9 % infusion, 75 mL/hr, Intravenous, Continuous, Kerley, Brian Joseph,     Allergies:   Allergies   Allergen Reactions   • Penicillins Rash     When was a child       Objective     Physical Exam:   Vital Signs:   Vitals:    08/24/22 1132   BP: 156/91   BP Location: Right arm   Patient Position: Lying   Pulse: 85   Resp: 18   Temp: 98.9 °F (37.2 °C)   TempSrc: Oral   SpO2: 96%   Weight: 123 kg (272 lb 0.8 oz)   Height: 180.3 cm (71\")     Body mass index is 37.94 kg/m².     Constitutional: NAD, WDWN.   HEENT: NCAT. Conjunctivae normal.  MMM.    Cardiovascular: Regular rate.  Pulmonary/Chest: Respirations are even and non-labored bilaterally.  Abdominal: Soft. No distension, tenderness, masses or guarding.  Neurological: sedate + O x 3.  Extremities: RODRI x 4    Skin: Pink, warm and dry.  No rashes noted.  Psychiatric:  Normal mood and affect    Labs:   No intake/output data recorded.    Lab Results   Component Value Date    GLUCOSE 154 (H) 08/24/2022    CALCIUM 9.2 08/24/2022     08/24/2022    K 4.0 08/24/2022    CO2 26.0 08/24/2022     08/24/2022    BUN 24 (H) 08/24/2022    CREATININE 1.80 (H) 08/24/2022    EGFRIFNONA 85 06/24/2018    BCR 13.3 08/24/2022    ANIONGAP 11.0 08/24/2022       Lab Results   Component Value Date    WBC 11.23 (H) 08/24/2022    HGB 15.4 08/24/2022    HCT " 45.6 08/24/2022    MCV 90.7 08/24/2022     08/24/2022       No results found for: URINECX    Brief Urine Lab Results  (Last result in the past 365 days)      Color   Clarity   Blood   Leuk Est   Nitrite   Protein   CREAT   Urine HCG        08/24/22 0800 Yellow   Clear   Small (1+)   Small (1+)   Negative   Negative                 Radiographic Studies  CT Abdomen Pelvis Without Contrast    Result Date: 8/24/2022  6 mm calculus in the distal right ureter causing a moderate right sided obstructive uropathy. Small hiatal hernia. Signer Name: Wenceslao Kuo MD  Signed: 8/24/2022 7:03 AM  Workstation Name: [facility]  Radiology [facility]    CT Chest Without Contrast Diagnostic    Result Date: 8/24/2022  6 mm calculus in the distal right ureter causing a moderate right sided obstructive uropathy. Small hiatal hernia. Signer Name: Wenceslao Kuo MD  Signed: 8/24/2022 7:03 AM  Workstation Name: [facility]  Radiology [facility]    XR Chest 1 View    Result Date: 8/24/2022  No acute cardiopulmonary findings. Signer Name: Wenceslao Kuo MD  Signed: 8/24/2022 6:17 AM  Workstation Name: [facility]  Radiology [facility]      There is no problem list on file for this patient.      Assessment / Plan      Assessment/Plan:     * No active hospital problems. *      1. Patient consented for Right URS/LL with stent placement.  We we discussed recommendation for ureteroscopy with laser lithotripsy and stent placement, stent will be removed in 1 week after surgery, risk for surgical complications include but are not limited to infection, bleeding, damage to surrounding organs, and anesthesia complications.  All questions answered patient agrees to surgery  2.  NPO after midnight surgery planned for 8/25/22       Follow Up:   1 week in urology clinic for cystoscopy with stent removal with FRANCESCA Blue   [facility] [place]     Assessment and plan were discussed  with attending Dr. Nevarez prior to signature of this note

## 2022-08-25 ENCOUNTER — ANESTHESIA (OUTPATIENT)
Dept: PERIOP | Facility: HOSPITAL | Age: 47
End: 2022-08-25

## 2022-08-25 ENCOUNTER — READMISSION MANAGEMENT (OUTPATIENT)
Dept: CALL CENTER | Facility: HOSPITAL | Age: 47
End: 2022-08-25

## 2022-08-25 ENCOUNTER — TELEPHONE (OUTPATIENT)
Dept: FAMILY MEDICINE CLINIC | Facility: CLINIC | Age: 47
End: 2022-08-25

## 2022-08-25 ENCOUNTER — ANESTHESIA EVENT (OUTPATIENT)
Dept: PERIOP | Facility: HOSPITAL | Age: 47
End: 2022-08-25

## 2022-08-25 VITALS
TEMPERATURE: 98.2 F | BODY MASS INDEX: 38.33 KG/M2 | HEART RATE: 75 BPM | WEIGHT: 273.81 LBS | SYSTOLIC BLOOD PRESSURE: 135 MMHG | OXYGEN SATURATION: 97 % | RESPIRATION RATE: 17 BRPM | DIASTOLIC BLOOD PRESSURE: 95 MMHG | HEIGHT: 71 IN

## 2022-08-25 PROBLEM — I10 HYPERTENSION: Status: ACTIVE | Noted: 2022-08-25

## 2022-08-25 PROBLEM — N20.0 NEPHROLITHIASIS: Status: ACTIVE | Noted: 2022-08-25

## 2022-08-25 LAB
ANION GAP SERPL CALCULATED.3IONS-SCNC: 6.2 MMOL/L (ref 5–15)
BACTERIA SPEC AEROBE CULT: NO GROWTH
BUN SERPL-MCNC: 22 MG/DL (ref 6–20)
BUN/CREAT SERPL: 18 (ref 7–25)
CALCIUM SPEC-SCNC: 8.8 MG/DL (ref 8.6–10.5)
CHLORIDE SERPL-SCNC: 103 MMOL/L (ref 98–107)
CO2 SERPL-SCNC: 27.8 MMOL/L (ref 22–29)
CREAT SERPL-MCNC: 1.22 MG/DL (ref 0.76–1.27)
DEPRECATED RDW RBC AUTO: 44 FL (ref 37–54)
EGFRCR SERPLBLD CKD-EPI 2021: 74.5 ML/MIN/1.73
ERYTHROCYTE [DISTWIDTH] IN BLOOD BY AUTOMATED COUNT: 13.4 % (ref 12.3–15.4)
GLUCOSE SERPL-MCNC: 108 MG/DL (ref 65–99)
HCT VFR BLD AUTO: 42.2 % (ref 37.5–51)
HGB BLD-MCNC: 14.7 G/DL (ref 13–17.7)
MCH RBC QN AUTO: 30.9 PG (ref 26.6–33)
MCHC RBC AUTO-ENTMCNC: 34.8 G/DL (ref 31.5–35.7)
MCV RBC AUTO: 88.7 FL (ref 79–97)
PLATELET # BLD AUTO: 143 10*3/MM3 (ref 140–450)
PMV BLD AUTO: 11.8 FL (ref 6–12)
POTASSIUM SERPL-SCNC: 4.6 MMOL/L (ref 3.5–5.2)
QT INTERVAL: 370 MS
QTC INTERVAL: 457 MS
RBC # BLD AUTO: 4.76 10*6/MM3 (ref 4.14–5.8)
SODIUM SERPL-SCNC: 137 MMOL/L (ref 136–145)
WBC NRBC COR # BLD: 9.16 10*3/MM3 (ref 3.4–10.8)

## 2022-08-25 PROCEDURE — 85027 COMPLETE CBC AUTOMATED: CPT | Performed by: STUDENT IN AN ORGANIZED HEALTH CARE EDUCATION/TRAINING PROGRAM

## 2022-08-25 PROCEDURE — C2617 STENT, NON-COR, TEM W/O DEL: HCPCS | Performed by: UROLOGY

## 2022-08-25 PROCEDURE — 25010000002 MIDAZOLAM PER 1MG: Performed by: NURSE ANESTHETIST, CERTIFIED REGISTERED

## 2022-08-25 PROCEDURE — C1769 GUIDE WIRE: HCPCS | Performed by: UROLOGY

## 2022-08-25 PROCEDURE — 25010000002 FENTANYL CITRATE (PF) 100 MCG/2ML SOLUTION: Performed by: NURSE ANESTHETIST, CERTIFIED REGISTERED

## 2022-08-25 PROCEDURE — 52356 CYSTO/URETERO W/LITHOTRIPSY: CPT | Performed by: UROLOGY

## 2022-08-25 PROCEDURE — 80048 BASIC METABOLIC PNL TOTAL CA: CPT | Performed by: STUDENT IN AN ORGANIZED HEALTH CARE EDUCATION/TRAINING PROGRAM

## 2022-08-25 PROCEDURE — 25010000002 IOPAMIDOL 61 % SOLUTION: Performed by: UROLOGY

## 2022-08-25 PROCEDURE — 25010000002 MORPHINE PER 10 MG: Performed by: STUDENT IN AN ORGANIZED HEALTH CARE EDUCATION/TRAINING PROGRAM

## 2022-08-25 PROCEDURE — 25010000002 MORPHINE PER 10 MG: Performed by: UROLOGY

## 2022-08-25 PROCEDURE — 99217 PR OBSERVATION CARE DISCHARGE MANAGEMENT: CPT | Performed by: NURSE PRACTITIONER

## 2022-08-25 PROCEDURE — 96376 TX/PRO/DX INJ SAME DRUG ADON: CPT

## 2022-08-25 PROCEDURE — 0 CEFAZOLIN SODIUM-DEXTROSE 2-3 GM-%(50ML) RECONSTITUTED SOLUTION: Performed by: UROLOGY

## 2022-08-25 PROCEDURE — 25010000002 ONDANSETRON PER 1 MG: Performed by: NURSE ANESTHETIST, CERTIFIED REGISTERED

## 2022-08-25 PROCEDURE — 25010000002 PROPOFOL 200 MG/20ML EMULSION: Performed by: NURSE ANESTHETIST, CERTIFIED REGISTERED

## 2022-08-25 PROCEDURE — G0378 HOSPITAL OBSERVATION PER HR: HCPCS

## 2022-08-25 PROCEDURE — C1758 CATHETER, URETERAL: HCPCS | Performed by: UROLOGY

## 2022-08-25 PROCEDURE — 25010000002 DEXAMETHASONE PER 1 MG: Performed by: NURSE ANESTHETIST, CERTIFIED REGISTERED

## 2022-08-25 DEVICE — URETERAL STENT
Type: IMPLANTABLE DEVICE | Site: URETER | Status: FUNCTIONAL
Brand: CONTOUR™

## 2022-08-25 RX ORDER — LIDOCAINE HYDROCHLORIDE 20 MG/ML
INJECTION, SOLUTION INTRAVENOUS AS NEEDED
Status: DISCONTINUED | OUTPATIENT
Start: 2022-08-25 | End: 2022-08-25 | Stop reason: SURG

## 2022-08-25 RX ORDER — ACETAMINOPHEN 500 MG
1000 TABLET ORAL EVERY 6 HOURS
Qty: 30 TABLET | Refills: 0 | Status: SHIPPED | OUTPATIENT
Start: 2022-08-25 | End: 2022-08-29

## 2022-08-25 RX ORDER — OXYBUTYNIN CHLORIDE 10 MG/1
10 TABLET, EXTENDED RELEASE ORAL DAILY PRN
Qty: 10 TABLET | Refills: 0 | Status: SHIPPED | OUTPATIENT
Start: 2022-08-25 | End: 2022-09-07

## 2022-08-25 RX ORDER — NICOTINE 21 MG/24HR
1 PATCH, TRANSDERMAL 24 HOURS TRANSDERMAL EVERY 24 HOURS
Qty: 28 PATCH | Refills: 0 | Status: SHIPPED | OUTPATIENT
Start: 2022-08-25 | End: 2022-09-24

## 2022-08-25 RX ORDER — FENTANYL CITRATE 50 UG/ML
INJECTION, SOLUTION INTRAMUSCULAR; INTRAVENOUS AS NEEDED
Status: DISCONTINUED | OUTPATIENT
Start: 2022-08-25 | End: 2022-08-25 | Stop reason: SURG

## 2022-08-25 RX ORDER — ONDANSETRON 2 MG/ML
INJECTION INTRAMUSCULAR; INTRAVENOUS AS NEEDED
Status: DISCONTINUED | OUTPATIENT
Start: 2022-08-25 | End: 2022-08-25 | Stop reason: SURG

## 2022-08-25 RX ORDER — SODIUM CHLORIDE 9 MG/ML
100 INJECTION, SOLUTION INTRAVENOUS CONTINUOUS
Status: DISCONTINUED | OUTPATIENT
Start: 2022-08-25 | End: 2022-08-25 | Stop reason: HOSPADM

## 2022-08-25 RX ORDER — ONDANSETRON 2 MG/ML
4 INJECTION INTRAMUSCULAR; INTRAVENOUS ONCE AS NEEDED
Status: DISCONTINUED | OUTPATIENT
Start: 2022-08-25 | End: 2022-08-25 | Stop reason: HOSPADM

## 2022-08-25 RX ORDER — SODIUM CHLORIDE, SODIUM LACTATE, POTASSIUM CHLORIDE, CALCIUM CHLORIDE 600; 310; 30; 20 MG/100ML; MG/100ML; MG/100ML; MG/100ML
1000 INJECTION, SOLUTION INTRAVENOUS CONTINUOUS
Status: CANCELLED | OUTPATIENT
Start: 2022-08-25

## 2022-08-25 RX ORDER — SODIUM CHLORIDE 0.9 % (FLUSH) 0.9 %
10 SYRINGE (ML) INJECTION AS NEEDED
Status: DISCONTINUED | OUTPATIENT
Start: 2022-08-25 | End: 2022-08-25 | Stop reason: HOSPADM

## 2022-08-25 RX ORDER — PROPOFOL 10 MG/ML
INJECTION, EMULSION INTRAVENOUS AS NEEDED
Status: DISCONTINUED | OUTPATIENT
Start: 2022-08-25 | End: 2022-08-25 | Stop reason: SURG

## 2022-08-25 RX ORDER — CEFAZOLIN SODIUM 2 G/50ML
2 SOLUTION INTRAVENOUS ONCE
Status: COMPLETED | OUTPATIENT
Start: 2022-08-25 | End: 2022-08-25

## 2022-08-25 RX ORDER — OXYCODONE HYDROCHLORIDE 5 MG/1
5 TABLET ORAL EVERY 6 HOURS PRN
Qty: 5 TABLET | Refills: 0 | Status: SHIPPED | OUTPATIENT
Start: 2022-08-25 | End: 2022-09-07

## 2022-08-25 RX ORDER — TAMSULOSIN HYDROCHLORIDE 0.4 MG/1
1 CAPSULE ORAL NIGHTLY
Qty: 10 CAPSULE | Refills: 0 | Status: SHIPPED | OUTPATIENT
Start: 2022-08-25 | End: 2022-09-07

## 2022-08-25 RX ORDER — DEXAMETHASONE SODIUM PHOSPHATE 4 MG/ML
INJECTION, SOLUTION INTRA-ARTICULAR; INTRALESIONAL; INTRAMUSCULAR; INTRAVENOUS; SOFT TISSUE AS NEEDED
Status: DISCONTINUED | OUTPATIENT
Start: 2022-08-25 | End: 2022-08-25 | Stop reason: SURG

## 2022-08-25 RX ORDER — PHENAZOPYRIDINE HYDROCHLORIDE 100 MG/1
100 TABLET, FILM COATED ORAL 3 TIMES DAILY PRN
Qty: 21 TABLET | Refills: 0 | Status: SHIPPED | OUTPATIENT
Start: 2022-08-25 | End: 2022-09-07

## 2022-08-25 RX ORDER — DOCUSATE SODIUM 100 MG/1
100 CAPSULE, LIQUID FILLED ORAL 2 TIMES DAILY
Qty: 15 CAPSULE | Refills: 1 | Status: SHIPPED | OUTPATIENT
Start: 2022-08-25

## 2022-08-25 RX ORDER — MIDAZOLAM HYDROCHLORIDE 2 MG/2ML
INJECTION, SOLUTION INTRAMUSCULAR; INTRAVENOUS AS NEEDED
Status: DISCONTINUED | OUTPATIENT
Start: 2022-08-25 | End: 2022-08-25 | Stop reason: SURG

## 2022-08-25 RX ORDER — CIPROFLOXACIN 500 MG/1
500 TABLET, FILM COATED ORAL DAILY
Qty: 3 TABLET | Refills: 0 | Status: SHIPPED | OUTPATIENT
Start: 2022-08-25 | End: 2022-08-28

## 2022-08-25 RX ADMIN — SODIUM CHLORIDE 100 ML/HR: 9 INJECTION, SOLUTION INTRAVENOUS at 07:47

## 2022-08-25 RX ADMIN — LIDOCAINE HYDROCHLORIDE 60 MG: 20 INJECTION, SOLUTION INTRAVENOUS at 09:19

## 2022-08-25 RX ADMIN — DEXAMETHASONE SODIUM PHOSPHATE 4 MG: 4 INJECTION, SOLUTION INTRAMUSCULAR; INTRAVENOUS at 09:23

## 2022-08-25 RX ADMIN — PROPOFOL 200 MG: 10 INJECTION, EMULSION INTRAVENOUS at 09:19

## 2022-08-25 RX ADMIN — SODIUM CHLORIDE 75 ML/HR: 9 INJECTION, SOLUTION INTRAVENOUS at 06:07

## 2022-08-25 RX ADMIN — ONDANSETRON 4 MG: 2 INJECTION INTRAMUSCULAR; INTRAVENOUS at 09:16

## 2022-08-25 RX ADMIN — FENTANYL CITRATE 100 MCG: 50 INJECTION INTRAMUSCULAR; INTRAVENOUS at 09:19

## 2022-08-25 RX ADMIN — CEFAZOLIN SODIUM 2 G: 2 SOLUTION INTRAVENOUS at 09:16

## 2022-08-25 RX ADMIN — MORPHINE SULFATE 2 MG: 2 INJECTION, SOLUTION INTRAMUSCULAR; INTRAVENOUS at 04:08

## 2022-08-25 RX ADMIN — MIDAZOLAM HYDROCHLORIDE 2 MG: 1 INJECTION, SOLUTION INTRAMUSCULAR; INTRAVENOUS at 09:16

## 2022-08-25 NOTE — CASE MANAGEMENT/SOCIAL WORK
Case Management Discharge Note           Provided Post Acute Provider List?: N/A  Provided Post Acute Provider Quality & Resource List?: N/A    Selected Continued Care - Discharged on 8/25/2022 Admission date: 8/24/2022 - Discharge disposition: Home or Self Care     Transportation Services  Private: Car    Final Discharge Disposition Code: 01 - home or self-care

## 2022-08-25 NOTE — ANESTHESIA PROCEDURE NOTES
Airway  Urgency: elective    Date/Time: 8/25/2022 9:20 AM    General Information and Staff    Patient location during procedure: OR  CRNA/CAA: Chip Engle CRNA    Indications and Patient Condition    Preoxygenated: yes  MILS maintained throughout  Mask difficulty assessment: 1 - vent by mask    Final Airway Details  Final airway type: supraglottic airway      Successful airway: classic  Size 5    Number of attempts at approach: 1  Assessment: lips, teeth, and gum same as pre-op    Additional Comments  LMA seats well

## 2022-08-25 NOTE — PLAN OF CARE
Goal Outcome Evaluation:  Plan of Care Reviewed With: patient, significant other, family        Progress: improving   Charlie is doing well post urethoscopy with laser lithotripsy and stent insertion. Charlie is discharged home with family to provide transport and assist as needed. Discharge teaching complete and follow-up  appointments provided. Pain is managed per MAR. Charlie is on room air and denies SOA.

## 2022-08-25 NOTE — OUTREACH NOTE
Prep Survey    Flowsheet Row Responses   [facility] patient discharged from? [facility]   Is LACE score < 7 ? Yes   Emergency Room discharge w/ pulse ox? No   Eligibility [facility]   Date of Admission 08/24/22   Date of Discharge 08/25/22   Discharge diagnosis Ureterolithiasis,  NIEVES-resolved   Does the patient have one of the following disease processes/diagnoses(primary or secondary)? Other   Does the patient have Home health ordered? No   Is there a DME ordered? No   Prep survey completed? Yes          LATRICE KHAN - Registered Nurse

## 2022-08-25 NOTE — ANESTHESIA PREPROCEDURE EVALUATION
Anesthesia Evaluation     Patient summary reviewed and Nursing notes reviewed   no history of anesthetic complications:  NPO Solid Status: > 8 hours  NPO Liquid Status: > 8 hours           Airway   Mallampati: I  TM distance: >3 FB  Neck ROM: full  no difficulty expected  Dental - normal exam     Pulmonary - normal exam   (+) asthma,  Cardiovascular - negative cardio ROS and normal exam        Neuro/Psych- negative ROS  GI/Hepatic/Renal/Endo - negative ROS     Musculoskeletal (-) negative ROS    Abdominal    Substance History - negative use     OB/GYN negative ob/gyn ROS         Other - negative ROS                     Anesthesia Plan    ASA 2     general     intravenous induction     Anesthetic plan, risks, benefits, and alternatives have been provided, discussed and informed consent has been obtained with: patient.        CODE STATUS:    Level Of Support Discussed With: Patient  Code Status (Patient has no pulse and is not breathing): CPR (Attempt to Resuscitate)  Medical Interventions (Patient has pulse or is breathing): Full Support

## 2022-08-25 NOTE — TELEPHONE ENCOUNTER
Caller: [facility]    Relationship to patient: Other    Best call back number: [phone]    New or established patient?  [x] New  [] Established    Date of discharge: 8/25/22    Facility discharged from: [facility]    Diagnosis/Symptoms: KIDNEY INJURY    Length of stay (If applicable): N/A    Specialty Only: Did you see a [facility] provider?    [] Yes  [] No  If so, who?

## 2022-08-25 NOTE — PLAN OF CARE
Problem: Adult Inpatient Plan of Care  Goal: Plan of Care Review  Outcome: Ongoing, Progressing  Flowsheets (Taken 8/24/2022 1412 by Mckayla Faustin RN)  Progress: no change     Problem: Adult Inpatient Plan of Care  Goal: Optimal Comfort and Wellbeing  Outcome: Ongoing, Progressing  Intervention: Monitor Pain and Promote Comfort  Recent Flowsheet Documentation  Taken 8/25/2022 0408 by Constance Rouse RN  Pain Management Interventions: see MAR  Taken 8/24/2022 2014 by Constance Rouse RN  Pain Management Interventions: see MAR     Problem: Pain Acute  Goal: Acceptable Pain Control and Functional Ability  Outcome: Ongoing, Progressing  Intervention: Develop Pain Management Plan  Recent Flowsheet Documentation  Taken 8/25/2022 0408 by Constance Rouse RN  Pain Management Interventions: see MAR  Taken 8/24/2022 2014 by Constance Rouse RN  Pain Management Interventions: see MAR     Progress: No change

## 2022-08-25 NOTE — ANESTHESIA POSTPROCEDURE EVALUATION
Patient: Lawrence Olea    Procedure Summary     Date: 08/25/22 Room / Location: [facility] /  [facility] OR    Anesthesia Start: 0915 Anesthesia Stop: 0955    Procedure: CYSTOSCOPY RETROGRADE PYELOGRAMS, URETEROSCOPY LASER LITHOTRIPSY WITH STENT INSERTION (Right ) Diagnosis:     Surgeons: Fareed Nevarez MD Provider: Chip Engle CRNA    Anesthesia Type: general ASA Status: 2          Anesthesia Type: general    Vitals  No vitals data found for the desired time range.          Post Anesthesia Care and Evaluation    Patient location during evaluation: PACU  Patient participation: complete - patient participated  Level of consciousness: awake and alert  Pain score: 2  Pain management: satisfactory to patient    Airway patency: patent  Anesthetic complications: No anesthetic complications  PONV Status: none  Cardiovascular status: acceptable and stable  Respiratory status: acceptable  Hydration status: acceptable    Comments: Vitals signs as noted in nursing documentation as per protocol.

## 2022-08-25 NOTE — OP NOTE
Preoperative diagnosis  right ureteral stone    Postoperative diagnosis  right ureteral stone    Procedure performed  1.  Flexible cystoscopy  2.  right retrograde pyelogram  3.  right ureteroscopy with holmium-YAG laser lithotripsy and basket extraction of stone fragments (50811)  4.  right ureteral stent placement 6 Fr x 28 cm  5.  Fluoroscopy time < 1 hour    Surgeon  Fareed Nevarez MD    Anesthesia  General    Complications  None    Specimen  none    EBL  Minimal    Findings  Cystoscopy revealed no tumors, stones or other mucosal abnormalities.  Retrograde pyelogram revealed a delicate system with identification of the stones seen on imaging.    Ureteroscopy revealed left distal ureteral stone.     Indications  47 y.o. male with a history of above agreed to undergo the above named procedure after discussion of the alternatives, risks and benefits. Informed consent was obtained.      Procedure  The patient was taken to the operating room and placed supine on the operating table.  Pre-operative antibiotics were administered.  Bilateral lower extremity SCDs were placed.  After induction of general anesthesia the patient was positioned in dorsal lithotomy, prepped and draped in a sterile fashion.  A time-out was performed.      A 14 Maori flexible cystoscope was passed carefully via urethra into the bladder.  The right ureteral orifice was identified and a Sensor wire was passed retrograde to the level of the kidney and confirmed by fluoroscopy.  The flexible scope was off-loaded and the bladder emptied with a straight catheter.  An 8-10 coaxial dilator was passed without difficulty and then removed.  The semirigid ureteroscope was passed carefully along the Sensor wire through the urethra and into the distal ureter.  The stone was encountered and fragmented with a 365-micron holmium YAG laser fiber at laser settings of 0.2 Joules and a frequency of 50 Hz.   At the completion of the procedure, all clinically  significant fragments were removed from the ureter and only dust-like fragments remained.  The ureteroscope was withdrawn.  A 5-Kinyarwanda open-ended was passed over the wire and the wire removed.  A retrograde pyelogram was performed by slowly injecting 5 mL of 50% Omnipaque contrast.  A 6 Fr x  cm stent was positioned with the upper end in the upper pole and the lower in the bladder confirmed by fluoroscopy. The bladder was emptied and the procedure was complete. The patient tolerated the procedure well and was stable throughout.    The patient will follow up with me in 1 week for stent removal .

## 2022-08-25 NOTE — CASE MANAGEMENT/SOCIAL WORK
Discharge Planning Assessment  [facility]     Patient Name: Lawrence Olea  MRN: [id number]  Today's Date: 8/25/2022    Admit Date: 8/24/2022     Discharge Needs Assessment     Row Name 08/25/22 1346       Living Environment    People in Home significant other    Current Living Arrangements home    Primary Care Provided by self    Provides Primary Care For no one    Family Caregiver if Needed significant other    Quality of Family Relationships supportive    Able to Return to Prior Arrangements yes    Living Arrangement Comments plans home on d/c; mother and so will transport       Resource/Environmental Concerns    Resource/Environmental Concerns none    Transportation Concerns none       Transition Planning    Patient/Family Anticipates Transition to home with family    Patient/Family Anticipated Services at Transition none    Transportation Anticipated family or friend will provide       Discharge Needs Assessment    Readmission Within the Last 30 Days no previous admission in last 30 days    Equipment Currently Used at Home none    Concerns to be Addressed discharge planning    Equipment Needed After Discharge none    Provided Post Acute Provider List? N/A    Provided Post Acute Provider Quality & Resource List? N/A               Discharge Plan     Row Name 08/25/22 1348       Plan    Plan pt resting in bed; mother and SO at bedside, approval to speak with them in room; stated plans to go home on d/c, they will transport; stated would accept any primary md in [place], informed US; no lw/poa, no info wanted; cm info card given and explained; zero medical equipment and none needed; stated lives with SO, they both stated occasionally short on money but no needs; plans to go home today and no other cm needs              Continued Care and Services - Admitted Since 8/24/2022    Coordination has not been started for this encounter.       Expected Discharge Date and Time     Expected Discharge Date Expected Discharge  Time    Aug 25, 2022          Demographic Summary     Row Name 08/25/22 1345       General Information    Admission Type inpatient    Arrived From emergency department    Referral Source admission list    Reason for Consult discharge planning    Preferred Language English       Contact Information    Permission Granted to Share Info With family/designee  girlfriend and mother in room               Functional Status     Row Name 08/25/22 1345       Functional Status    Usual Activity Tolerance moderate    Current Activity Tolerance moderate       Functional Status, IADL    Medications independent    Meal Preparation independent    Housekeeping independent    Laundry independent    Shopping independent       Mental Status    General Appearance WDL WDL       Mental Status Summary    Recent Changes in Mental Status/Cognitive Functioning no changes                  Fara Dean, RN

## 2022-08-25 NOTE — DISCHARGE INSTRUCTIONS
Home Care After Ureteroscopy and Laser Lithotripsy  The following instructions will help you care for yourself, or be cared for upon your return home today.    These are guidelines for your care right after surgery only.     Diet  Drink plenty of liquids and eat light meals today.    Start your regular diet tomorrow.    Activity  Start normal activities in twenty-four (24) hours.    Wound Care and Hygiene  No restrictions, start normal routine.    Anesthesia Precautions & Expectations  After anesthesia, rest for 24 hours.    Do not drive, drink alcoholic beverages or make any important decisions during this time.  General anesthesia may cause a sore throat, jaw discomfort or muscle aches.    These symptoms can last for one or two days.     What to Expect after Surgery  Mild pain with voiding.  Frequency or urgency.  Bladder cramps.  Minimal bleeding with voiding.    Call your Doctor  Passing clots in urine preventing bladder emptying  Severe pain not controlled by oral medication  Temperature above 101.5 degrees  Inability to urinate within eight (8) hours after surgery    After Stent Placement  It is common to have blood tinged urine for 3-5 days.  It is common to have pain in your side and in your back when you urinate for 3-5 days.  It is common to have urgency with urination.  This is a temporary stent and will need to be removed in the office in 1 week.  Do not take the Pyridium 24 hours prior to your stent removal.    Other Contacts  Urology Office:  [id number]   [place] [address] [phone] office  [phone] fax    Other Instructions  Take tamsulosin daily until the stent comes out.  Take oxybutynin daily as needed for bladder spasm while the stent is in.  Take Pyridium up to 3 times daily as needed for burning with urination.   Take Tylenol scheduled every 6 hours for the first 3 days.  Take the oxycodone on top of that as needed.  Take all of the antibiotics.     Follow up  Appointment  1 week for stent removal. Please See After visit Summary. Call if you do not have an appt already scheduled.         Follow up with PCP to establish care as scheduled.  Try to cut back on soda intake and drink more water.  Keep blood pressure log to take to PCP appointment.

## 2022-08-25 NOTE — DISCHARGE SUMMARY
[facility]   DISCHARGE SUMMARY      Name:  Lawrence Olea   Age:  47 y.o.  Sex:  male  :  1975  MRN:  [id number]   Visit Number:  [id number]    Admission Date:  2022  Date of Discharge:  2022  Primary Care Physician:  Provider, No Known    Important issues to note:    1.  Patient sent to our facility for ureterolithiasis--6mm right stone and NIEVES.  Transferred here for Urology to consult.    2.  Taken to OR with Dr. Nevarez 2022 for ureteroscopy with laser lithotripsy and stent placement.      3.  Stable for discharge home after procedure.  Will set up PCP for recheck BMP in one week to ensure creatinine is back to baseline.  Smoking cessation advised with nicotine patches at discharge.  Advised to keep blood pressure log to take to PCP for marginally high BP during admission.    Discharge Diagnoses:     1. Ureterolithiasis  2. Acute Kidney Injury, resolved  3. Tobacco abuse      Problem List:     Active Hospital Problems    Diagnosis  POA   • Nephrolithiasis [N20.0]  Yes   • Hypertension [I10]  Yes      Resolved Hospital Problems   No resolved problems to display.     Presenting Problem:    No chief complaint on file.     Consults:     Consulting Physician(s)  Chat With All Active Members    Provider Relationship Specialty    Fareed Nevarez MD  Consulting Physician Urology        Procedures Performed:    Procedure(s):  CYSTOSCOPY RETROGRADE PYELOGRAMS, URETEROSCOPY LASER LITHOTRIPSY WITH STENT INSERTION    History of presenting illness/Hospital Course:    Patient is a transfer from [facility].  Patient is a 47 year old male who denies any past health medical problems or any daily medications.  Chart review notes that patient does have past history of illicit drug use and acute Hepatitis.  Patient states he began have right flank and abdominal pain about 3 days ago.  Patient states he tried Ibuprofen without relief.  States that he does have some  pain after finishing urinating.  States pain is worse with movement.  Patient states he currently smokes 1 PPD for past 15 years, denies alcohol or drug use.  Works in home construction.  He is accompanied by his significant other at bedside.  He denies having a family doctor or routinely visiting the doctor.  Patient sates his pain is currently manageable at 6/7.  States that his legs always have some swelling.  Patient also states he does not drink any water and consumes at least 6-8 sodas daily with decreased urine output being his normal.       Work up at [facility] noted glucose-154, creatinine-1.8, BUN-24, and mildly elevated AST-43 and ALT-53, WBC-11.  Patient did apparently complain of shortness of breath at [place] [facility] prior to arriving to this facility.  He does not have these complaints on my exam, he only complains of flank/ abdominal pain.  CT of chest without contrast noted 6 mm calculus in the distal right ureter causing a moderate right sided obstructive uropathy and small hiatal hernia.  CT of Abdomen/Pelvis with same findings.  CXR noted no acute cardiopulmonary findings.  Urinalysis noted no bacteria, 6-12 WBC, 6-12 RBC, and small amount of blood.  Patient received NS bolus, compazine, benadryl, zofran, tylenol, morphine, and rocephin prior to transfer.  [facility] did not have Urology coverage, so patient was accepted to this facility with Dr. Nevarez consulting.    Taken to OR 8/25/2022 with Dr. Nevarez for ureteroscopy with laser lithotripsy and stent placement.  Plans for stent to be removed in 1 week.  Patient tolerated procedure well.  Recommend keeping blood pressure log and following up with PCP in [place] as blood pressure has been marginally high during admission.  NIEVES is resolving with morning labs.  Creatinine noted to be 1.2 with morning labs.  Would recommend recheck BMP in one week to ensure creatinine has returned to baseline. Will arrange PCP to establish care  at discharge.  Discharge medications per Dr. Nevarez with Cipro, Colace, Ditropan, Oxycodone, Pyridium, and Flomax.  Patient to return to the hospital with any issues.  Discussed smoking cessation with nicotine patches given at discharge.    Vital Signs:    Temp:  [98.2 °F (36.8 °C)-100.4 °F (38 °C)] 98.2 °F (36.8 °C)  Heart Rate:  [] 75  Resp:  [15-18] 17  BP: (135-151)/(85-99) 135/95    Physical Exam:    General Appearance:  Alert and cooperative, pleasant middle aged male, no acute distress on exam   Head:  Atraumatic and normocephalic.   Eyes: Conjunctivae and sclerae normal, no icterus. No pallor.   Ears:  Ears with no abnormalities noted.   Throat: No oral lesions, no thrush, oral mucosa moist.   Neck: Supple, trachea midline   Back:   No kyphoscoliosis present. No tenderness to palpation.   Lungs:   Breath sounds heard bilaterally equally.  No crackles or wheezing. Unlabored on room air   Heart:  Normal S1 and S2, no murmur, no gallop, no rub. No JVD.   Abdomen:   Normal bowel sounds, no masses, no organomegaly. Soft, nontender, nondistended, no rebound tenderness.   Extremities: Supple, trace edema to lower extremities, no cyanosis, no clubbing.   Pulses: Pulses palpable bilaterally.   Skin: No bleeding or rash.   Neurologic: Alert and oriented x 3. No facial asymmetry. Moves all four limbs.      Pertinent Lab Results:     Results from last 7 days   Lab Units 08/25/22 0627 08/24/22  0545   SODIUM mmol/L 137 138   POTASSIUM mmol/L 4.6 4.0   CHLORIDE mmol/L 103 101   CO2 mmol/L 27.8 26.0   BUN mg/dL 22* 24*   CREATININE mg/dL 1.22 1.80*   CALCIUM mg/dL 8.8 9.2   BILIRUBIN mg/dL  --  1.1   ALK PHOS U/L  --  92   ALT (SGPT) U/L  --  53*   AST (SGOT) U/L  --  43*   GLUCOSE mg/dL 108* 154*     Results from last 7 days   Lab Units 08/25/22 0627 08/24/22  0545   WBC 10*3/mm3 9.16 11.23*   HEMOGLOBIN g/dL 14.7 15.4   HEMATOCRIT % 42.2 45.6   PLATELETS 10*3/mm3 143 160     Results from last 7 days   Lab Units  08/24/22  0545   INR  1.05     Results from last 7 days   Lab Units 08/24/22  0545   CK TOTAL U/L 176                     Results from last 7 days   Lab Units 08/24/22  0800   URINECX  No growth       Pertinent Radiology Results:    Imaging Results (All)     None        Condition on Discharge:      Stable.    Code status during the hospital stay:    Code Status and Medical Interventions:   Ordered at: 08/24/22 2135     Level Of Support Discussed With:    Patient     Code Status (Patient has no pulse and is not breathing):    CPR (Attempt to Resuscitate)     Medical Interventions (Patient has pulse or is breathing):    Full Support     Discharge Disposition:    Home or Self Care    Discharge Medications:       Discharge Medications      New Medications      Instructions Start Date   Acetaminophen Extra Strength 500 MG tablet  Generic drug: acetaminophen   Take 2 tablets by mouth Every 6 (Six) Hours      ciprofloxacin 500 MG tablet  Commonly known as: Cipro   500 mg, Oral, Daily      docusate sodium 100 MG capsule  Commonly known as: Colace   Take 1 capsule by mouth 2 (Two) Times a Day If taking oxycodone      nicotine 21 MG/24HR patch  Commonly known as: NICODERM CQ   Place 1 patch on the skin as directed by provider Daily      oxybutynin XL 10 MG 24 hr tablet  Commonly known as: Ditropan XL   Take 1 tablet by mouth Daily As Needed for bladder spasm      oxyCODONE 5 MG immediate release tablet  Commonly known as: Roxicodone   Take 1 tablet by mouth Every 6 (Six) Hours As Needed for Moderate or Severe Pain .      phenazopyridine 100 MG tablet  Commonly known as: PYRIDIUM   Take 1 tablet by mouth 3 (Three) Times a Day As Needed for urinary burning      tamsulosin 0.4 MG capsule 24 hr capsule  Commonly known as: FLOMAX   0.4 mg, Oral, Nightly         Stop These Medications    ibuprofen 800 MG tablet  Commonly known as: ADVIL,MOTRIN          Discharge Diet:     Diet Instructions     Diet: Regular      Discharge Diet:  Regular        Activity at Discharge:     Activity Instructions     Gradually Increase Activity Until at Pre-Hospitalization Level          Follow-up Appointments:     Follow-up Information     Fareed Nevarez MD Follow up on 9/7/2022.    Specialty: Urology  Why: @ 3:40 pm  Contact information:  [address]  [place] [address] [place] [address]  [phone]             Yojana Stewart APRN Follow up on 8/30/2022.    Specialty: Nurse Practitioner  Why: @ 10:00 am  Contact information:  [address]  [facility] [address]  [phone]             Provider, No Known .    Contact information:  [facility] [address]                       Future Appointments   Date Time Provider Department Center   8/30/2022 10:00 AM Yojana Stewart APRN [facility]   8/31/2022  3:40 PM Fareed Nevarez MD [facility]        Vivian ROSE FRANCESCA Flores  08/25/22  13:42 EDT    Time: I spent 29 minutes on this discharge activity which included: face-to-face encounter with the patient, reviewing the data in the system, coordination of the care with the nursing staff as well as consultants, documentation, and entering orders.     Dictated utilizing Dragon dictation.

## 2022-08-26 ENCOUNTER — TRANSITIONAL CARE MANAGEMENT TELEPHONE ENCOUNTER (OUTPATIENT)
Dept: CALL CENTER | Facility: HOSPITAL | Age: 47
End: 2022-08-26

## 2022-08-26 NOTE — OUTREACH NOTE
Call Center TCM Note    Flowsheet Row Responses   [facility] patient discharged from? [facility]   Does the patient have one of the following disease processes/diagnoses(primary or secondary)? Other   TCM attempt successful? Yes   Call start time 1121   Call end time 1130   Discharge diagnosis Ureterolithiasis,  NIEVES-resolved, ureteroscopywith laser lithotripsy and stent placement   Person spoke with today (if not patient) and relationship patient   Meds reviewed with patient/caregiver? Yes   Does the patient have all medications ordered at discharge? Yes   Is the patient taking all medications as directed (includes completed medication regime)? Yes   Comments regarding appointments UROLOGY Wednesday Aug 31, 2022 3:40 PM   Does the patient have a primary care provider?  Yes   Does the patient have an appointment with their PCP within 7 days of discharge? Yes   Comments regarding PCP New Patient with FRANCESCA Bates Tuesday Aug 30, 2022 10:00 AM   Has the patient kept scheduled appointments due by today? N/A   Has home health visited the patient within 72 hours of discharge? N/A   Psychosocial issues? No   Comments Patient to keep blood pressure log and bring to PCP office for marginally high BP during admission.    Did the patient receive a copy of their discharge instructions? Yes   Nursing interventions Reviewed instructions with patient   What is the patient's perception of their health status since discharge? Improving  [Patient reports passing his urine OK, still has right sided tenderness. ]   Is the patient/caregiver able to teach back signs and symptoms related to disease process for when to call PCP? Yes   Is the patient/caregiver able to teach back signs and symptoms related to disease process for when to call 911? Yes   Is the patient/caregiver able to teach back the hierarchy of who to call/visit for symptoms/problems? PCP, Specialist, Home health nurse, Urgent Care, ED, 911 Yes   TCM call  completed? Yes   Wrap up additional comments Denies further questions or needs today.           Betsey Del Angel RN    8/26/2022, 11:30 EDT

## 2022-08-27 DIAGNOSIS — N20.0 NEPHROLITHIASIS: ICD-10-CM

## 2022-08-27 RX ORDER — OXYCODONE HYDROCHLORIDE 5 MG/1
5 TABLET ORAL EVERY 6 HOURS PRN
Qty: 5 TABLET | Refills: 0 | Status: CANCELLED | OUTPATIENT
Start: 2022-08-27

## 2022-09-07 ENCOUNTER — PROCEDURE VISIT (OUTPATIENT)
Dept: UROLOGY | Facility: CLINIC | Age: 47
End: 2022-09-07

## 2022-09-07 DIAGNOSIS — N20.0 NEPHROLITHIASIS: Primary | ICD-10-CM

## 2022-09-07 PROCEDURE — 52310 CYSTOSCOPY AND TREATMENT: CPT | Performed by: UROLOGY

## 2022-09-07 NOTE — PROGRESS NOTES
Preoperative diagnosis  Foreign body in genitourinary tract    Postoperative diagnosis  Foreign body in genitourinary tract    Procedure  Flexible cystourethroscopy with stent removal    Attending surgeon  Fareed Nevarez MD    Anesthesia  2% lidocaine jelly intraurethrally    Complications  None    Indications  47 y.o. male who is status post ureteroscopy with laser lithotripsy who presents for stent removal.    Procedure  Detailed information of all possible complications and side effects were discussed with the patient.  Informed consent was obtained. Patient was given one dose of antibiotics. The patient was placed in supine position and a timeout was performed. The patient was prepped and draped in sterile fashion.  Next, 2% lidocaine jelly was bluntly injected per urethra without difficulty. The 14 Cymro flexible cystoscope was passed through the urethra and into the bladder.  The stent was visualized, grasped and removed in its entirety.  The patient tolerated the procedure well.    Plan  1. Provided education regarding water intake of at least 2 liters per day  2. F/u in 8 weeks with a renal ultrasound

## 2022-11-04 ENCOUNTER — TELEPHONE (OUTPATIENT)
Dept: UROLOGY | Facility: CLINIC | Age: 47
End: 2022-11-04

## 2022-11-23 NOTE — TELEPHONE ENCOUNTER
Called Patient to let them know they had not completed their renal Ultrasound for appointment monday & will need to reschedule once completed.

## 2025-04-16 ENCOUNTER — TRANSCRIBE ORDERS (OUTPATIENT)
Dept: ADMINISTRATIVE | Facility: HOSPITAL | Age: 50
End: 2025-04-16
Payer: MEDICAID

## 2025-04-16 ENCOUNTER — HOSPITAL ENCOUNTER (OUTPATIENT)
Dept: ULTRASOUND IMAGING | Facility: HOSPITAL | Age: 50
Discharge: HOME | End: 2025-04-16
Admitting: NURSE PRACTITIONER
Payer: MEDICAID

## 2025-04-16 DIAGNOSIS — M79.662 PAIN OF LEFT LOWER LEG: ICD-10-CM

## 2025-04-16 DIAGNOSIS — M79.662 PAIN OF LEFT LOWER LEG: Primary | ICD-10-CM

## 2025-04-16 PROCEDURE — 93971 EXTREMITY STUDY: CPT

## 2025-04-16 PROCEDURE — 93971 EXTREMITY STUDY: CPT | Performed by: RADIOLOGY

## (undated) DEVICE — SOL IRR NACL 0.9PCT 3000ML

## (undated) DEVICE — RICH CYSTO: Brand: MEDLINE INDUSTRIES, INC.

## (undated) DEVICE — SLV SCD CALF HEMOFORCE DVT THERP REPROC MD

## (undated) DEVICE — GLV SURG SENSICARE LT W/ALOE PF LF 7 STRL

## (undated) DEVICE — ST URO THERMEDX/FLUIDSMART IRR/FLD/WARM PNT/PRESS/300MMHG

## (undated) DEVICE — DUAL LUMEN URETERAL CATHETER

## (undated) DEVICE — GW AMPLTZ SUPRSTF PTFE JB .035 7X145CM

## (undated) DEVICE — NITINOL WIRE WITH HYDROPHILIC TIP: Brand: SENSOR

## (undated) DEVICE — FIBR LASR FLEXIVAPULSEID/TRACTIP HOLMIUM BALL/TP 200MH 1P/U

## (undated) DEVICE — CATHETER,URETHRAL,REDRUBBER,STRL,18FR: Brand: MEDLINE

## (undated) DEVICE — ADAPT/Y SCPE GATEWAY ADVNTGE